# Patient Record
Sex: FEMALE | Race: WHITE | NOT HISPANIC OR LATINO | Employment: FULL TIME | URBAN - METROPOLITAN AREA
[De-identification: names, ages, dates, MRNs, and addresses within clinical notes are randomized per-mention and may not be internally consistent; named-entity substitution may affect disease eponyms.]

---

## 2025-05-21 PROBLEM — E78.5 HLD (HYPERLIPIDEMIA): Status: ACTIVE | Noted: 2022-12-27

## 2025-05-21 PROBLEM — C54.1 ENDOMETRIAL CANCER (MULTI): Status: ACTIVE | Noted: 2023-04-17

## 2025-07-01 ENCOUNTER — APPOINTMENT (OUTPATIENT)
Dept: PRIMARY CARE | Facility: CLINIC | Age: 60
End: 2025-07-01
Payer: COMMERCIAL

## 2025-07-16 ENCOUNTER — APPOINTMENT (OUTPATIENT)
Dept: DERMATOLOGY | Facility: CLINIC | Age: 60
End: 2025-07-16
Payer: COMMERCIAL

## 2025-07-22 ASSESSMENT — PROMIS GLOBAL HEALTH SCALE
RATE_QUALITY_OF_LIFE: EXCELLENT
CARRYOUT_PHYSICAL_ACTIVITIES: COMPLETELY
RATE_SOCIAL_SATISFACTION: EXCELLENT
CARRYOUT_SOCIAL_ACTIVITIES: VERY GOOD
RATE_MENTAL_HEALTH: VERY GOOD
RATE_PHYSICAL_HEALTH: EXCELLENT
RATE_AVERAGE_PAIN: 0
RATE_AVERAGE_FATIGUE: MILD
RATE_GENERAL_HEALTH: VERY GOOD
EMOTIONAL_PROBLEMS: RARELY

## 2025-07-23 ENCOUNTER — APPOINTMENT (OUTPATIENT)
Dept: PRIMARY CARE | Facility: CLINIC | Age: 60
End: 2025-07-23
Payer: COMMERCIAL

## 2025-07-23 VITALS
TEMPERATURE: 97.2 F | BODY MASS INDEX: 40.5 KG/M2 | DIASTOLIC BLOOD PRESSURE: 80 MMHG | OXYGEN SATURATION: 94 % | HEART RATE: 68 BPM | HEIGHT: 66 IN | WEIGHT: 252 LBS | SYSTOLIC BLOOD PRESSURE: 142 MMHG

## 2025-07-23 DIAGNOSIS — R73.9 HYPERGLYCEMIA: Primary | ICD-10-CM

## 2025-07-23 DIAGNOSIS — R63.5 ABNORMAL WEIGHT GAIN: ICD-10-CM

## 2025-07-23 DIAGNOSIS — Z11.4 SCREENING FOR HIV (HUMAN IMMUNODEFICIENCY VIRUS): ICD-10-CM

## 2025-07-23 DIAGNOSIS — E55.9 VITAMIN D DEFICIENCY: ICD-10-CM

## 2025-07-23 DIAGNOSIS — Z13.6 SCREENING FOR CARDIOVASCULAR CONDITION: ICD-10-CM

## 2025-07-23 DIAGNOSIS — Z11.59 NEED FOR HEPATITIS C SCREENING TEST: ICD-10-CM

## 2025-07-23 DIAGNOSIS — E78.5 HYPERLIPIDEMIA, UNSPECIFIED HYPERLIPIDEMIA TYPE: ICD-10-CM

## 2025-07-23 DIAGNOSIS — Z00.00 HEALTHCARE MAINTENANCE: ICD-10-CM

## 2025-07-23 PROCEDURE — 99396 PREV VISIT EST AGE 40-64: CPT | Performed by: INTERNAL MEDICINE

## 2025-07-23 PROCEDURE — 3008F BODY MASS INDEX DOCD: CPT | Performed by: INTERNAL MEDICINE

## 2025-07-23 PROCEDURE — 1036F TOBACCO NON-USER: CPT | Performed by: INTERNAL MEDICINE

## 2025-07-23 ASSESSMENT — PATIENT HEALTH QUESTIONNAIRE - PHQ9
1. LITTLE INTEREST OR PLEASURE IN DOING THINGS: NOT AT ALL
SUM OF ALL RESPONSES TO PHQ9 QUESTIONS 1 & 2: 0
2. FEELING DOWN, DEPRESSED OR HOPELESS: NOT AT ALL

## 2025-07-23 ASSESSMENT — LIFESTYLE VARIABLES
HOW MANY STANDARD DRINKS CONTAINING ALCOHOL DO YOU HAVE ON A TYPICAL DAY: PATIENT DOES NOT DRINK
SKIP TO QUESTIONS 9-10: 1
HOW OFTEN DO YOU HAVE SIX OR MORE DRINKS ON ONE OCCASION: NEVER
AUDIT-C TOTAL SCORE: 0
HOW OFTEN DO YOU HAVE A DRINK CONTAINING ALCOHOL: NEVER

## 2025-07-23 ASSESSMENT — PAIN SCALES - GENERAL: PAINLEVEL_OUTOF10: 0-NO PAIN

## 2025-07-23 NOTE — PROGRESS NOTES
"Standard Progress Note  Chief Complaint   Patient presents with    [facility] Care    Annual Exam     New pt here to establish with Dr. Robison and for AWV       HPI:  Nancy Carter 60 y.o.   female  NEEDS pcp  Hx endometrial cancer. Gyn Dr Haque.  Status post BRUCE/BSO  referred to Dr Jaime to determine if she needed additional treatment.  He did not recommend any.  Patient indicates that she does not frequent Paps after the hysterectomy and everything was okay.  Per chart history of hyperlipidemia but she does not recall this.    Social history: .  No children.  No alcohol.  No tobacco use.  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  Sister with MI.  Sister is younger than her.  Mom passed away at 69 from MI.  Mother was also   Father with prostate and renal cancer.  He  at 84  Problem List[1]      Blood pressure 142/80, pulse 68, temperature 36.2 °C (97.2 °F), height 1.67 m (5' 5.75\"), weight 114 kg (252 lb), SpO2 94%.  Body mass index is 40.98 kg/m².    General: NAD. Alert.   HEENT: Normocephalic atraumatic.    Eyes: no scleral icterus. Extraocular movements intact.  Pupils equal round and reactive to light.  Ears: TM intact.  No cerumen. Hearing grossly intact.   Throat: No exudate  Neck:  Supple. No thyroid goiter.  Lymph nodes: No cervical or clavicular adenopathy  Cardiovascular: Regular rate rhythm S1-S2 normal no murmur. No carotid bruit.   Lungs: Clear to Auscultation without wheezing, rales, or rhonchi, Breath sounds symmetric. No use of accessory muscles  Abdomen:  Normoactive bowel sounds, soft, non-tender. No mass.  No organomegaly  Extremities: No pretibial edema  Neuro: no facial asymmetry. Strength upper and lower extremities 5/5. Sensation intact to light touch. No tremor. Babinski negative  Skin: no rash noted  Vascular: DP pulses intact.   Declined chaperone. Breast no mass, discharge, or axillary adenopathy    Labs  glucose 103    1. Hyperglycemia (Primary)  - Comprehensive Metabolic Panel; " Future  - Hemoglobin A1C; Future  - CBC; Future  - Comprehensive Metabolic Panel  - Hemoglobin A1C  - CBC    2. Hyperlipidemia, unspecified hyperlipidemia type  - Lipid Panel; Future  - Lipid Panel    3. Vitamin D deficiency  - Vitamin D 25-Hydroxy,Total (for eval of Vitamin D levels); Future  - Vitamin D 25-Hydroxy,Total (for eval of Vitamin D levels)    4. Screening for HIV (human immunodeficiency virus)  - HIV 1/2 Antigen/Antibody Screen with Reflex to Confirmation; Future  - HIV 1/2 Antigen/Antibody Screen with Reflex to Confirmation    5. Need for hepatitis C screening test  - Hepatitis C Antibody; Future  - Hepatitis C Antibody    6. Abnormal weight gain  - TSH with reflex to Free T4 if abnormal; Future  - TSH with reflex to Free T4 if abnormal    7. Screening for cardiovascular condition  Discussed pros/cons of testing  - CT cardiac scoring wo IV contrast; Future    8. Healthcare maintenance      Medications Ordered Prior to Encounter[2]      Eda Robison MD       [1]   Patient Active Problem List  Diagnosis    Endometrial cancer (Multi)    HLD (hyperlipidemia)   [2]   No current outpatient medications on file prior to visit.     No current facility-administered medications on file prior to visit.

## 2025-07-24 ENCOUNTER — APPOINTMENT (OUTPATIENT)
Dept: RADIOLOGY | Facility: HOSPITAL | Age: 60
End: 2025-07-24
Payer: COMMERCIAL

## 2025-07-25 ENCOUNTER — HOSPITAL ENCOUNTER (OUTPATIENT)
Dept: RADIOLOGY | Facility: HOSPITAL | Age: 60
Discharge: HOME | End: 2025-07-25
Payer: COMMERCIAL

## 2025-07-25 DIAGNOSIS — Z13.6 SCREENING FOR CARDIOVASCULAR CONDITION: ICD-10-CM

## 2025-07-25 PROCEDURE — 75571 CT HRT W/O DYE W/CA TEST: CPT

## 2025-07-26 LAB
25(OH)D3+25(OH)D2 SERPL-MCNC: 27 NG/ML (ref 30–100)
ALBUMIN SERPL-MCNC: 4.3 G/DL (ref 3.6–5.1)
ALP SERPL-CCNC: 57 U/L (ref 37–153)
ALT SERPL-CCNC: 21 U/L (ref 6–29)
ANION GAP SERPL CALCULATED.4IONS-SCNC: 8 MMOL/L (CALC) (ref 7–17)
AST SERPL-CCNC: 12 U/L (ref 10–35)
BILIRUB SERPL-MCNC: 0.6 MG/DL (ref 0.2–1.2)
BUN SERPL-MCNC: 15 MG/DL (ref 7–25)
CALCIUM SERPL-MCNC: 9.3 MG/DL (ref 8.6–10.4)
CHLORIDE SERPL-SCNC: 103 MMOL/L (ref 98–110)
CHOLEST SERPL-MCNC: 257 MG/DL
CHOLEST/HDLC SERPL: 6.3 (CALC)
CO2 SERPL-SCNC: 29 MMOL/L (ref 20–32)
CREAT SERPL-MCNC: 0.71 MG/DL (ref 0.5–1.05)
EGFRCR SERPLBLD CKD-EPI 2021: 96 ML/MIN/1.73M2
ERYTHROCYTE [DISTWIDTH] IN BLOOD BY AUTOMATED COUNT: 12.7 % (ref 11–15)
EST. AVERAGE GLUCOSE BLD GHB EST-MCNC: 126 MG/DL
EST. AVERAGE GLUCOSE BLD GHB EST-SCNC: 7 MMOL/L
GLUCOSE SERPL-MCNC: 112 MG/DL (ref 65–99)
HBA1C MFR BLD: 6 %
HCT VFR BLD AUTO: 44.9 % (ref 35–45)
HCV AB SERPL QL IA: NORMAL
HDLC SERPL-MCNC: 41 MG/DL
HGB BLD-MCNC: 14.8 G/DL (ref 11.7–15.5)
HIV 1+2 AB+HIV1 P24 AG SERPL QL IA: NORMAL
HIV 1+2 AB+HIV1 P24 AG SERPL QL IA: NORMAL
LDLC SERPL CALC-MCNC: 184 MG/DL (CALC)
MCH RBC QN AUTO: 29.4 PG (ref 27–33)
MCHC RBC AUTO-ENTMCNC: 33 G/DL (ref 32–36)
MCV RBC AUTO: 89.1 FL (ref 80–100)
NONHDLC SERPL-MCNC: 216 MG/DL (CALC)
PLATELET # BLD AUTO: 257 THOUSAND/UL (ref 140–400)
PMV BLD REES-ECKER: 9 FL (ref 7.5–12.5)
POTASSIUM SERPL-SCNC: 4.5 MMOL/L (ref 3.5–5.3)
PROT SERPL-MCNC: 7.3 G/DL (ref 6.1–8.1)
RBC # BLD AUTO: 5.04 MILLION/UL (ref 3.8–5.1)
SODIUM SERPL-SCNC: 140 MMOL/L (ref 135–146)
TRIGL SERPL-MCNC: 168 MG/DL
TSH SERPL-ACNC: 2.11 MIU/L (ref 0.4–4.5)
WBC # BLD AUTO: 7.1 THOUSAND/UL (ref 3.8–10.8)

## 2025-07-28 DIAGNOSIS — E55.9 VITAMIN D DEFICIENCY: Primary | ICD-10-CM

## 2025-07-28 RX ORDER — ERGOCALCIFEROL 1.25 MG/1
CAPSULE ORAL
Qty: 8 CAPSULE | Refills: 0 | Status: SHIPPED | OUTPATIENT
Start: 2025-07-28

## 2025-09-05 ENCOUNTER — APPOINTMENT (OUTPATIENT)
Dept: DERMATOLOGY | Facility: CLINIC | Age: 60
End: 2025-09-05
Payer: COMMERCIAL

## 2026-07-27 ENCOUNTER — APPOINTMENT (OUTPATIENT)
Dept: PRIMARY CARE | Facility: CLINIC | Age: 61
End: 2026-07-27
Payer: COMMERCIAL